# Patient Record
(demographics unavailable — no encounter records)

---

## 2025-04-16 NOTE — PLAN
[FreeTextEntry1] : 59 y/o p2 with normal exam stable bse/calcium per endo for mammo/sono for colonoscopy precautions f/u for annual

## 2025-04-16 NOTE — PHYSICAL EXAM
[MA] : MA [FreeTextEntry2] : Myah [Appropriately responsive] : appropriately responsive [Alert] : alert [No Acute Distress] : no acute distress [Clear to Auscultation B/L] : clear to auscultation bilaterally [Soft] : soft [Non-tender] : non-tender [Non-distended] : non-distended [No HSM] : No HSM [No Lesions] : no lesions [No Mass] : no mass [Oriented x3] : oriented x3 [Examination Of The Breasts] : a normal appearance [No Masses] : no breast masses were palpable [Labia Majora] : normal [Labia Minora] : normal [Uterine Adnexae] : normal [Normal rectal exam] : was normal [Normal Brown Stool] : was normal and brown [Internal Hemorrhoid] : no internal hemorrhoids were present [External Hemorrhoid] : no external hemorrhoids were present [Skin Tags] : no residual hemorrhoidal skin tags [Normal] : was normal [None] : there was no rectal mass

## 2025-04-16 NOTE — HISTORY OF PRESENT ILLNESS
[FreeTextEntry1] : 57 y/o [id number] LMP age 50 , here for [facility]. no bleeding, pain or discharge. mammo, going next month, colonoscopy had around age 50, will schedule.  nsd x 2  parathyroidectomy, partial  htn - on meds  hypercholesterol - on meds  osteopenia - follows with endo  non smoker